# Patient Record
Sex: MALE | Race: WHITE | NOT HISPANIC OR LATINO | Employment: FULL TIME | URBAN - NONMETROPOLITAN AREA
[De-identification: names, ages, dates, MRNs, and addresses within clinical notes are randomized per-mention and may not be internally consistent; named-entity substitution may affect disease eponyms.]

---

## 2023-05-12 ENCOUNTER — TELEPHONE (OUTPATIENT)
Dept: SURGERY | Facility: CLINIC | Age: 23
End: 2023-05-12

## 2023-05-12 ENCOUNTER — OFFICE VISIT (OUTPATIENT)
Dept: SURGERY | Facility: CLINIC | Age: 23
End: 2023-05-12
Payer: COMMERCIAL

## 2023-05-12 VITALS
BODY MASS INDEX: 24.99 KG/M2 | RESPIRATION RATE: 16 BRPM | SYSTOLIC BLOOD PRESSURE: 118 MMHG | WEIGHT: 150 LBS | HEIGHT: 65 IN | DIASTOLIC BLOOD PRESSURE: 72 MMHG | HEART RATE: 72 BPM

## 2023-05-12 DIAGNOSIS — Z87.19 HISTORY OF GI BLEED: ICD-10-CM

## 2023-05-12 DIAGNOSIS — Z86.19 HISTORY OF HELICOBACTER PYLORI INFECTION: ICD-10-CM

## 2023-05-12 DIAGNOSIS — R10.13 EPIGASTRIC PAIN: Primary | ICD-10-CM

## 2023-05-12 DIAGNOSIS — R10.11 RUQ PAIN: ICD-10-CM

## 2023-05-12 PROCEDURE — 99203 OFFICE O/P NEW LOW 30 MIN: CPT | Performed by: SURGERY

## 2023-05-12 RX ORDER — OMEPRAZOLE 40 MG/1
40 CAPSULE, DELAYED RELEASE ORAL DAILY
COMMUNITY

## 2023-05-12 RX ORDER — SUCRALFATE 1 G/1
1 TABLET ORAL 4 TIMES DAILY
Qty: 120 TABLET | Refills: 1 | Status: SHIPPED | OUTPATIENT
Start: 2023-05-12 | End: 2024-05-11

## 2023-05-12 NOTE — PROGRESS NOTES
Garfield Maguire 23 y.o. male presents as a self ref for eval poss EGD.  Pt reports he was DX with a gastric bleed in Dec 2022.  DX with H Pylori in Feb.  Pt states he is still taking the Rx for H Pylori because he decided to stretch out the treatment until he could get EGD.   Chief Complaint   Patient presents with   • EGD             HPI   Above note and agree.  This very pleasant 23-year-old male has been having issues with epigastric and right upper quadrant pain since Christmas of 2020.  He was having some issues with pain and then on Christmas he was going to the bathroom and passed out.  When he woke up he had a lot of coffee-ground emesis in the bathroom.  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  The next day he went to the emergency department because he felt tachycardic.  He had a CT scan that showed hepatic steatosis.  He also had a CBC that showed an anemia.  He had an H. pylori breath test which was positive.  He was treated for his H. pylori with omeprazole and antibiotics and was supposed to get an EGD but he had no insurance so he was unable to do that.  He has been taking omeprazole and has been taking amoxicillin and clarithromycin but not 4 times daily.  He does not smoke or use tobacco products.  He has no chest pain or shortness of breath.  He has no fevers or chills.  He is still having epigastric tenderness.  He drinks occasional soda and [employer].  He is moving his bowels daily without blood.  He also uses a lot of hot sauce.      Review of Systems   All other systems reviewed and are negative.            Current Outpatient Medications:   •  AMOXICILLIN PO, Take  by mouth., Disp: , Rfl:   •  CLARITHROMYCIN PO, Take  by mouth., Disp: , Rfl:   •  omeprazole (priLOSEC) 40 MG capsule, Take 1 capsule by mouth Daily., Disp: , Rfl:   •  sucralfate (Carafate) 1 g tablet, Take 1 tablet by mouth 4 (Four) Times a Day., Disp: 120 tablet, Rfl: 1        No Known Allergies        No past medical  "history on file.        No past surgical history on file.                   There is no immunization history on file for this patient.        Physical Exam  Vitals and nursing note reviewed.   Constitutional:       Appearance: Normal appearance.   HENT:      Head: Normocephalic and atraumatic.   Cardiovascular:      Rate and Rhythm: Normal rate and regular rhythm.   Pulmonary:      Effort: Pulmonary effort is normal.      Breath sounds: Normal breath sounds.   Abdominal:      General: Bowel sounds are normal.      Palpations: Abdomen is soft.   Musculoskeletal:         General: No swelling or tenderness.   Skin:     General: Skin is warm and dry.   Neurological:      General: No focal deficit present.      Mental Status: He is alert and oriented to person, place, and time.   Psychiatric:         Mood and Affect: Mood normal.         Behavior: Behavior normal.         Debilities/Disabilities Identified: None    Emotional Behavior: Appropriate      /72   Pulse 72   Resp 16   Ht 165.1 cm (65\")   Wt 68 kg (150 lb)   BMI 24.96 kg/m²         Diagnoses and all orders for this visit:    1. Epigastric pain (Primary)    2. RUQ pain    3. History of GI bleed    4. History of Helicobacter pylori infection    Other orders  -     sucralfate (Carafate) 1 g tablet; Take 1 tablet by mouth 4 (Four) Times a Day.  Dispense: 120 tablet; Refill: 1    We discussed some dietary changes.  I also told Ramos to stop taking the antibiotics because he was not taking them appropriately.  I will add Carafate.  We will get him scheduled for an EGD and gallbladder ultrasound.  I discussed with the patient the benefits and risks of performing endoscopy.  Benefits and risks were not limited to but including bleeding, infection, perforation, complications of anesthesia, aspiration.  The patient appeared to understand and is willing to proceed.    Thank you for allowing me to participate in the care of this interesting patient.            "

## 2023-05-12 NOTE — TELEPHONE ENCOUNTER
Caller: Garfield Maguire    Relationship: Self    Best call back number: [phone]    What is the best time to reach you: ANY    Who are you requesting to speak with (clinical staff, provider,  specific staff member): RICH    What was the call regarding: PT REQ TO SPEAK TO RICH. UNABLE TO WARM TRANSFER    Do you require a callback: YES

## 2023-05-12 NOTE — H&P
Garfield Maguire 23 y.o. male presents as a self ref for eval poss EGD.  Pt reports he was DX with a gastric bleed in Dec 2022.  DX with H Pylori in Feb.  Pt states he is still taking the Rx for H Pylori because he decided to stretch out the treatment until he could get EGD.   Chief Complaint   Patient presents with   • EGD             HPI   Above note and agree.  This very pleasant 23-year-old male has been having issues with epigastric and right upper quadrant pain since Christmas of 2020.  He was having some issues with pain and then on Christmas he was going to the bathroom and passed out.  When he woke up he had a lot of coffee-ground emesis in the bathroom.  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  The next day he went to the emergency department because he felt tachycardic.  He had a CT scan that showed hepatic steatosis.  He also had a CBC that showed an anemia.  He had an H. pylori breath test which was positive.  He was treated for his H. pylori with omeprazole and antibiotics and was supposed to get an EGD but he had no insurance so he was unable to do that.  He has been taking omeprazole and has been taking amoxicillin and clarithromycin but not 4 times daily.  He does not smoke or use tobacco products.  He has no chest pain or shortness of breath.  He has no fevers or chills.  He is still having epigastric tenderness.  He drinks occasional soda and [employer].  He is moving his bowels daily without blood.  He also uses a lot of hot sauce.      Review of Systems   All other systems reviewed and are negative.            Current Outpatient Medications:   •  AMOXICILLIN PO, Take  by mouth., Disp: , Rfl:   •  CLARITHROMYCIN PO, Take  by mouth., Disp: , Rfl:   •  omeprazole (priLOSEC) 40 MG capsule, Take 1 capsule by mouth Daily., Disp: , Rfl:   •  sucralfate (Carafate) 1 g tablet, Take 1 tablet by mouth 4 (Four) Times a Day., Disp: 120 tablet, Rfl: 1        No Known Allergies        No past medical  "history on file.        No past surgical history on file.                   There is no immunization history on file for this patient.        Physical Exam  Vitals and nursing note reviewed.   Constitutional:       Appearance: Normal appearance.   HENT:      Head: Normocephalic and atraumatic.   Cardiovascular:      Rate and Rhythm: Normal rate and regular rhythm.   Pulmonary:      Effort: Pulmonary effort is normal.      Breath sounds: Normal breath sounds.   Abdominal:      General: Bowel sounds are normal.      Palpations: Abdomen is soft.   Musculoskeletal:         General: No swelling or tenderness.   Skin:     General: Skin is warm and dry.   Neurological:      General: No focal deficit present.      Mental Status: He is alert and oriented to person, place, and time.   Psychiatric:         Mood and Affect: Mood normal.         Behavior: Behavior normal.         Debilities/Disabilities Identified: None    Emotional Behavior: Appropriate      /72   Pulse 72   Resp 16   Ht 165.1 cm (65\")   Wt 68 kg (150 lb)   BMI 24.96 kg/m²         Diagnoses and all orders for this visit:    1. Epigastric pain (Primary)    2. RUQ pain    3. History of GI bleed    4. History of Helicobacter pylori infection    Other orders  -     sucralfate (Carafate) 1 g tablet; Take 1 tablet by mouth 4 (Four) Times a Day.  Dispense: 120 tablet; Refill: 1    We discussed some dietary changes.  I also told Ramos to stop taking the antibiotics because he was not taking them appropriately.  I will add Carafate.  We will get him scheduled for an EGD and gallbladder ultrasound.  I discussed with the patient the benefits and risks of performing endoscopy.  Benefits and risks were not limited to but including bleeding, infection, perforation, complications of anesthesia, aspiration.  The patient appeared to understand and is willing to proceed.    Thank you for allowing me to participate in the care of this interesting " patient.

## 2023-05-16 ENCOUNTER — TELEPHONE (OUTPATIENT)
Dept: SURGERY | Facility: CLINIC | Age: 23
End: 2023-05-16
Payer: COMMERCIAL

## 2023-05-16 DIAGNOSIS — R10.11 RUQ PAIN: Primary | ICD-10-CM

## 2023-05-16 DIAGNOSIS — R10.13 EPIGASTRIC PAIN: ICD-10-CM

## 2023-07-25 PROBLEM — R10.84 GENERALIZED ABDOMINAL PAIN: Status: ACTIVE | Noted: 2023-07-25

## 2023-07-31 ENCOUNTER — LAB (OUTPATIENT)
Dept: LAB | Facility: HOSPITAL | Age: 23
End: 2023-07-31
Payer: COMMERCIAL

## 2023-07-31 ENCOUNTER — HOSPITAL ENCOUNTER (OUTPATIENT)
Dept: GENERAL RADIOLOGY | Facility: HOSPITAL | Age: 23
Discharge: HOME | End: 2023-07-31
Payer: COMMERCIAL

## 2023-07-31 DIAGNOSIS — R06.09 OTHER FORM OF DYSPNEA: ICD-10-CM

## 2023-07-31 DIAGNOSIS — R10.11 RUQ PAIN: ICD-10-CM

## 2023-07-31 DIAGNOSIS — R10.11 RUQ PAIN: Primary | ICD-10-CM

## 2023-07-31 PROCEDURE — 82657 ENZYME CELL ACTIVITY: CPT

## 2023-07-31 PROCEDURE — 86618 LYME DISEASE ANTIBODY: CPT

## 2023-07-31 PROCEDURE — 36415 COLL VENOUS BLD VENIPUNCTURE: CPT

## 2023-07-31 PROCEDURE — 71046 X-RAY EXAM CHEST 2 VIEWS: CPT

## 2023-08-01 DIAGNOSIS — Z86.19 HISTORY OF HELICOBACTER PYLORI INFECTION: Primary | ICD-10-CM

## 2023-08-01 LAB — B BURGDOR IGG+IGM SER QL IA: NEGATIVE

## 2023-08-03 LAB
A-GALACTOSIDASE A SERPL-CCNC: 0.16 U/L (ref 0.07–0.46)
CLINICAL BIOCHEMIST REVIEW: NORMAL
PROVIDER SIGNING NAME: NORMAL
REASON FOR REFERRAL (NARRATIVE): NORMAL

## 2023-08-07 ENCOUNTER — HOSPITAL ENCOUNTER (OUTPATIENT)
Dept: NUCLEAR MEDICINE | Facility: HOSPITAL | Age: 23
Discharge: HOME | End: 2023-08-07
Payer: COMMERCIAL

## 2023-08-07 ENCOUNTER — LAB (OUTPATIENT)
Dept: LAB | Facility: HOSPITAL | Age: 23
End: 2023-08-07
Payer: COMMERCIAL

## 2023-08-07 DIAGNOSIS — Z86.19 HISTORY OF HELICOBACTER PYLORI INFECTION: ICD-10-CM

## 2023-08-07 DIAGNOSIS — R10.11 RUQ PAIN: ICD-10-CM

## 2023-08-07 PROCEDURE — 86677 HELICOBACTER PYLORI ANTIBODY: CPT

## 2023-08-07 PROCEDURE — 36415 COLL VENOUS BLD VENIPUNCTURE: CPT

## 2023-08-08 ENCOUNTER — TELEPHONE (OUTPATIENT)
Dept: SURGERY | Facility: CLINIC | Age: 23
End: 2023-08-08
Payer: COMMERCIAL

## 2023-08-08 LAB — H PYLORI IGA SER-ACNC: >35 UNITS (ref 0–8.9)

## 2023-08-08 NOTE — TELEPHONE ENCOUNTER
Rec'd the following message from pt via My Chart:    I am having some Gi bleeds. Change in color of stool went from normal to dark.     Responded to pt by asking him to call the office to discuss.  Pt reports that on 08/06/2023,  he noticed his stool was darker in color and he has a little nausea.  Pt Informed Dr. Dillon is out of the office until 08/16/23, and asked if he felt like he was ok to wait for her return or if he would like a ref to another provider.  Pt states he is working and will continue monitoring the situation. He will let me know if he thinks he needs to be seen sooner.

## 2023-08-14 ENCOUNTER — HOSPITAL ENCOUNTER (OUTPATIENT)
Dept: NUCLEAR MEDICINE | Facility: HOSPITAL | Age: 23
Discharge: HOME | End: 2023-08-14
Payer: COMMERCIAL

## 2023-08-14 PROCEDURE — 0 TECHNETIUM TC 99M MEBROFENIN KIT: Performed by: SURGERY

## 2023-08-14 PROCEDURE — A9537 TC99M MEBROFENIN: HCPCS | Performed by: SURGERY

## 2023-08-14 PROCEDURE — 78227 HEPATOBIL SYST IMAGE W/DRUG: CPT

## 2023-08-14 PROCEDURE — 25010000002 SINCALIDE PER 5 MCG: Performed by: SURGERY

## 2023-08-14 RX ORDER — KIT FOR THE PREPARATION OF TECHNETIUM TC 99M MEBROFENIN 45 MG/10ML
1 INJECTION, POWDER, LYOPHILIZED, FOR SOLUTION INTRAVENOUS
Status: COMPLETED | OUTPATIENT
Start: 2023-08-14 | End: 2023-08-14

## 2023-08-14 RX ADMIN — SINCALIDE 1.4 MCG: 5 INJECTION, POWDER, LYOPHILIZED, FOR SOLUTION INTRAVENOUS at 09:22

## 2023-08-14 RX ADMIN — MEBROFENIN 1 DOSE: 45 INJECTION, POWDER, LYOPHILIZED, FOR SOLUTION INTRAVENOUS at 08:27

## 2023-08-17 RX ORDER — OMEPRAZOLE 40 MG/1
40 CAPSULE, DELAYED RELEASE ORAL 2 TIMES DAILY WITH MEALS
Qty: 28 CAPSULE | Refills: 0 | Status: SHIPPED | OUTPATIENT
Start: 2023-08-17 | End: 2023-09-04

## 2023-08-29 ENCOUNTER — TELEPHONE (OUTPATIENT)
Dept: SURGERY | Facility: CLINIC | Age: 23
End: 2023-08-29
Payer: COMMERCIAL

## 2023-08-29 NOTE — TELEPHONE ENCOUNTER
Caller: EDDIE WEED    Relationship to patient: SELF     Best call back number: [phone]    Patient is needing: PT CALLED TO GET DIRECTIONS FOR COLONOSCOPY TOMORROW. PLEASE GIVE HIM A CALL BACK ASAP.

## 2023-08-30 ENCOUNTER — HOSPITAL ENCOUNTER (OUTPATIENT)
Facility: SURGERY CENTER | Age: 23
Setting detail: HOSPITAL OUTPATIENT SURGERY
Discharge: HOME | End: 2023-08-30
Attending: SURGERY | Admitting: SURGERY
Payer: COMMERCIAL

## 2023-08-30 ENCOUNTER — ANESTHESIA EVENT (OUTPATIENT)
Dept: SURGERY | Facility: SURGERY CENTER | Age: 23
End: 2023-08-30
Payer: COMMERCIAL

## 2023-08-30 ENCOUNTER — ANESTHESIA (OUTPATIENT)
Dept: SURGERY | Facility: SURGERY CENTER | Age: 23
End: 2023-08-30
Payer: COMMERCIAL

## 2023-08-30 VITALS
HEART RATE: 79 BPM | BODY MASS INDEX: 25.92 KG/M2 | RESPIRATION RATE: 16 BRPM | HEIGHT: 65 IN | TEMPERATURE: 98.4 F | OXYGEN SATURATION: 98 % | SYSTOLIC BLOOD PRESSURE: 106 MMHG | DIASTOLIC BLOOD PRESSURE: 74 MMHG | WEIGHT: 155.6 LBS

## 2023-08-30 DIAGNOSIS — R10.84 GENERALIZED ABDOMINAL PAIN: ICD-10-CM

## 2023-08-30 PROCEDURE — 88305 TISSUE EXAM BY PATHOLOGIST: CPT | Performed by: SURGERY

## 2023-08-30 PROCEDURE — 0 LIDOCAINE 1 % SOLUTION: Performed by: ANESTHESIOLOGY

## 2023-08-30 PROCEDURE — 25010000002 PROPOFOL 10 MG/ML EMULSION: Performed by: ANESTHESIOLOGY

## 2023-08-30 PROCEDURE — 45385 COLONOSCOPY W/LESION REMOVAL: CPT | Performed by: SURGERY

## 2023-08-30 PROCEDURE — 25010000002 PHENYLEPHRINE 10 MG/ML SOLUTION: Performed by: ANESTHESIOLOGY

## 2023-08-30 PROCEDURE — 87081 CULTURE SCREEN ONLY: CPT | Performed by: SURGERY

## 2023-08-30 PROCEDURE — 43239 EGD BIOPSY SINGLE/MULTIPLE: CPT | Performed by: SURGERY

## 2023-08-30 RX ORDER — SODIUM CHLORIDE, SODIUM LACTATE, POTASSIUM CHLORIDE, CALCIUM CHLORIDE 600; 310; 30; 20 MG/100ML; MG/100ML; MG/100ML; MG/100ML
20 INJECTION, SOLUTION INTRAVENOUS CONTINUOUS
Status: DISCONTINUED | OUTPATIENT
Start: 2023-08-30 | End: 2023-08-30 | Stop reason: HOSPADM

## 2023-08-30 RX ORDER — LIDOCAINE HYDROCHLORIDE 10 MG/ML
INJECTION, SOLUTION INFILTRATION; PERINEURAL AS NEEDED
Status: DISCONTINUED | OUTPATIENT
Start: 2023-08-30 | End: 2023-08-30 | Stop reason: SURG

## 2023-08-30 RX ORDER — MAGNESIUM HYDROXIDE 1200 MG/15ML
LIQUID ORAL AS NEEDED
Status: DISCONTINUED | OUTPATIENT
Start: 2023-08-30 | End: 2023-08-30 | Stop reason: HOSPADM

## 2023-08-30 RX ORDER — PHENYLEPHRINE HYDROCHLORIDE 10 MG/ML
INJECTION INTRAVENOUS AS NEEDED
Status: DISCONTINUED | OUTPATIENT
Start: 2023-08-30 | End: 2023-08-30 | Stop reason: SURG

## 2023-08-30 RX ADMIN — PROPOFOL INJECTABLE EMULSION 160 MCG/KG/MIN: 10 INJECTION, EMULSION INTRAVENOUS at 11:16

## 2023-08-30 RX ADMIN — LIDOCAINE HYDROCHLORIDE 60 MG: 10 INJECTION, SOLUTION INFILTRATION; PERINEURAL at 11:16

## 2023-08-30 RX ADMIN — SODIUM CHLORIDE, POTASSIUM CHLORIDE, SODIUM LACTATE AND CALCIUM CHLORIDE 20 ML/HR: 600; 310; 30; 20 INJECTION, SOLUTION INTRAVENOUS at 10:32

## 2023-08-30 RX ADMIN — PHENYLEPHRINE HYDROCHLORIDE 100 MCG: 10 INJECTION INTRAVENOUS at 11:42

## 2023-08-30 NOTE — ANESTHESIA PREPROCEDURE EVALUATION
Anesthesia Evaluation     NPO Solid Status: > 8 hours  NPO Liquid Status: > 8 hours           Airway   Mallampati: I  No difficulty expected  Dental      Pulmonary    Cardiovascular   Exercise tolerance: good (4-7 METS)        Neuro/Psych  GI/Hepatic/Renal/Endo    (+) PUD    Musculoskeletal     Abdominal    Substance History      OB/GYN          Other                      Anesthesia Plan    ASA 1     MAC     intravenous induction     Anesthetic plan, risks, benefits, and alternatives have been provided, discussed and informed consent has been obtained with: patient.    CODE STATUS:

## 2023-08-30 NOTE — ANESTHESIA POSTPROCEDURE EVALUATION
Patient: Garfield Maguire    Procedure Summary       Date: 08/30/23 Room / Location: [facility]    Anesthesia Start: 1111 Anesthesia Stop: 1148    Procedures:       ESOPHAGOGASTRODUODENOSCOPY      COLONOSCOPY 11:00 Diagnosis:       Generalized abdominal pain      (Generalized abdominal pain [R10.84])    Surgeons: Mayela Dillon DO Provider: Darryl Perez MD    Anesthesia Type: MAC ASA Status: 1            Anesthesia Type: MAC    Vitals  Vitals Value Taken Time   /74 08/30/23 1213   Temp 36.9 øC (98.4 øF) 08/30/23 1148   Pulse 79 08/30/23 1213   Resp 16 08/30/23 1213   SpO2 98 % 08/30/23 1213           Post Anesthesia Care and Evaluation    Patient location during evaluation: bedside  Patient participation: complete - patient participated  Level of consciousness: responsive to light touch, responsive to verbal stimuli and sleepy but conscious  Pain score: 0  Pain management: adequate    Airway patency: patent  Anesthetic complications: No anesthetic complications  PONV Status: none  Cardiovascular status: acceptable and hemodynamically stable  Respiratory status: acceptable  Hydration status: acceptable

## 2023-08-30 NOTE — OP NOTE
EGD and Colonoscopy Procedure Note      Pre-operative Diagnosis: Abdominal pain, history of GI bleed, history of H. pylori infection    Post-operative Diagnosis: Esophagitis, gastric ulcer, duodenitis, polyp of the sigmoid colon    Procedure:  EGD with biopsies with cold forceps and  Colonoscopy with polypectomy using hot snare    Surgeon: Santana    Anesthetic: BONI per Darryl Perez MD    Estimated Blood Loss: Minimal    Complications: None    Indications: See preoperative diagnosis    Findings/Treatments:   Esophagitis-biopsy of GE junction with cold forceps  Gastric ulcer-biopsy for H. pylori with cold forceps  Duodenitis-multiple biopsies of duodenum with cold forceps  Sigmoid colon polyp-removed with hot snare       Scope Withdrawal Time:  6 minutes      Recommendations: Await pathology      Procedure Details     After discussing the benefits and risks of an EGD and Colonoscopy, benefits and risks not limited to but including:  Bleeding, infection, perforation, aspiration; informed consent was signed.  The patient was taken into the endoscopy suite at [facility] and placed in the left lateral decubitus position.  MAC anesthesia was induced under appropriate monitoring.  Bite block was placed and the gastroscope was inserted thru such and advanced under direct vision to second portion of duodenum.  A careful inspection was made as the gastroscope was withdrawn, including a retroflexed view of the proximal stomach; findings and interventions are described below.  If biopsies were taken, this was done with the cold biopsy forceps.    The bed was then rotated 180 degrees.  A rectal exam was performed.  Sphincter tone was normal.  The colonoscope was then inserted and carefully advanced to the cecum while visualizing the mucosa.  The cecum was identified by the ileocecal valve and the orifice of the appendix.  Once in the cecum the terminal ileum was intubated.  We advanced the scope  several centimeters of the terminal ileum and found no abnormalities.  The scope was then withdrawn into the cecum.  The scope was then slowly withdrawn while carefully evaluating the mucosa and deflating air.  The only abnormality noted was a large sigmoid colon polyp removed with hot snare.  Once in the rectum the scope was retroflexed and straightened and removed.  Ramos was then taken to the recovery area in stable postoperative condition having tolerated his procedure well.    Mayela Dillon DO

## 2023-08-30 NOTE — H&P
Garfield Maguire 23 y.o. male presents as a self ref for eval poss EGD.  Pt reports he was DX with a gastric bleed in Dec 2022.  DX with H Pylori in Feb.  Pt states he is still taking the Rx for H Pylori because he decided to stretch out the treatment until he could get EGD.       Chief Complaint   Patient presents with    EGD                  HPI   Above note and agree.  This very pleasant 23-year-old male has been having issues with epigastric and right upper quadrant pain since Christmas of 2020.  He was having some issues with pain and then on Christmas he was going to the bathroom and passed out.  When he woke up he had a lot of coffee-ground emesis in the bathroom.  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  The next day he went to the emergency department because he felt tachycardic.  He had a CT scan that showed hepatic steatosis.  He also had a CBC that showed an anemia.  He had an H. pylori breath test which was positive.  He was treated for his H. pylori with omeprazole and antibiotics and was supposed to get an EGD but he had no insurance so he was unable to do that.  He has been taking omeprazole and has been taking amoxicillin and clarithromycin but not 4 times daily.  He does not smoke or use tobacco products.  He has no chest pain or shortness of breath.  He has no fevers or chills.  He is still having epigastric tenderness.  He drinks occasional soda and [employer].  He is moving his bowels daily without blood.  He also uses a lot of hot sauce.        Review of Systems   All other systems reviewed and are negative.                 Current Outpatient Medications:     AMOXICILLIN PO, Take  by mouth., Disp: , Rfl:     CLARITHROMYCIN PO, Take  by mouth., Disp: , Rfl:     omeprazole (priLOSEC) 40 MG capsule, Take 1 capsule by mouth Daily., Disp: , Rfl:     sucralfate (Carafate) 1 g tablet, Take 1 tablet by mouth 4 (Four) Times a Day., Disp: 120 tablet, Rfl: 1           No Known Allergies          "  No past medical history on file.           No past surgical history on file.                          There is no immunization history on file for this patient.           Physical Exam  Vitals and nursing note reviewed.   Constitutional:       Appearance: Normal appearance.   HENT:      Head: Normocephalic and atraumatic.   Cardiovascular:      Rate and Rhythm: Normal rate and regular rhythm.   Pulmonary:      Effort: Pulmonary effort is normal.      Breath sounds: Normal breath sounds.   Abdominal:      General: Bowel sounds are normal.      Palpations: Abdomen is soft.   Musculoskeletal:         General: No swelling or tenderness.   Skin:     General: Skin is warm and dry.   Neurological:      General: No focal deficit present.      Mental Status: He is alert and oriented to person, place, and time.   Psychiatric:         Mood and Affect: Mood normal.         Behavior: Behavior normal.            Debilities/Disabilities Identified: None     Emotional Behavior: Appropriate        /72   Pulse 72   Resp 16   Ht 165.1 cm (65\")   Wt 68 kg (150 lb)   BMI 24.96 kg/mý            Diagnoses and all orders for this visit:     1. Epigastric pain (Primary)     2. RUQ pain     3. History of GI bleed     4. History of Helicobacter pylori infection     Other orders  -     sucralfate (Carafate) 1 g tablet; Take 1 tablet by mouth 4 (Four) Times a Day.  Dispense: 120 tablet; Refill: 1     We discussed some dietary changes.  I also told Ramos to stop taking the antibiotics because he was not taking them appropriately.  I will add Carafate.  We will get him scheduled for an EGD and gallbladder ultrasound.  I discussed with the patient the benefits and risks of performing endoscopy.  Benefits and risks were not limited to but including bleeding, infection, perforation, complications of anesthesia, aspiration.  The patient appeared to understand and is willing to proceed.     Thank you for allowing me to participate in the " care of this interesting patient.

## 2023-08-31 LAB
LAB AP CASE REPORT: NORMAL
LAB AP DIAGNOSIS COMMENT: NORMAL
PATH REPORT.FINAL DX SPEC: NORMAL
PATH REPORT.GROSS SPEC: NORMAL
UREASE TISS QL: POSITIVE

## 2023-09-01 RX ORDER — OMEPRAZOLE 40 MG/1
40 CAPSULE, DELAYED RELEASE ORAL 2 TIMES DAILY WITH MEALS
Qty: 28 CAPSULE | Refills: 0 | Status: SHIPPED | OUTPATIENT
Start: 2023-09-01 | End: 2023-09-15

## 2023-09-01 RX ORDER — METRONIDAZOLE 500 MG/1
500 TABLET ORAL 4 TIMES DAILY
Qty: 56 TABLET | Refills: 0 | Status: SHIPPED | OUTPATIENT
Start: 2023-09-01 | End: 2023-09-15

## 2023-09-01 RX ORDER — TETRACYCLINE HYDROCHLORIDE 500 MG/1
500 CAPSULE ORAL 4 TIMES DAILY
Qty: 56 CAPSULE | Refills: 0 | Status: SHIPPED | OUTPATIENT
Start: 2023-09-01 | End: 2023-09-15

## 2023-09-26 ENCOUNTER — LAB (OUTPATIENT)
Dept: LAB | Facility: HOSPITAL | Age: 23
End: 2023-09-26
Payer: COMMERCIAL

## 2023-09-26 DIAGNOSIS — K62.5 RECTAL BLEEDING: Primary | ICD-10-CM

## 2023-09-26 DIAGNOSIS — K62.5 RECTAL BLEEDING: ICD-10-CM

## 2023-09-26 LAB
HCT VFR BLD AUTO: 38.5 % (ref 37.5–51)
HGB BLD-MCNC: 12.3 G/DL (ref 13–17.7)

## 2023-09-26 PROCEDURE — 36415 COLL VENOUS BLD VENIPUNCTURE: CPT

## 2023-09-26 PROCEDURE — 85018 HEMOGLOBIN: CPT

## 2023-09-26 PROCEDURE — 85014 HEMATOCRIT: CPT

## 2023-09-27 ENCOUNTER — ANESTHESIA EVENT (OUTPATIENT)
Dept: PERIOP | Facility: HOSPITAL | Age: 23
End: 2023-09-27
Payer: COMMERCIAL

## 2023-09-27 ENCOUNTER — ANESTHESIA (OUTPATIENT)
Dept: PERIOP | Facility: HOSPITAL | Age: 23
End: 2023-09-27
Payer: COMMERCIAL

## 2023-09-27 ENCOUNTER — HOSPITAL ENCOUNTER (OUTPATIENT)
Facility: HOSPITAL | Age: 23
Setting detail: OBSERVATION
Discharge: HOME | End: 2023-09-28
Attending: SURGERY | Admitting: SURGERY
Payer: COMMERCIAL

## 2023-09-27 DIAGNOSIS — K92.2 ACUTE GI BLEEDING: Primary | ICD-10-CM

## 2023-09-27 LAB
ALBUMIN SERPL-MCNC: 4.3 G/DL (ref 3.5–5.2)
ALBUMIN/GLOB SERPL: 1.7 G/DL
ALP SERPL-CCNC: 61 U/L (ref 39–117)
ALT SERPL W P-5'-P-CCNC: 37 U/L (ref 1–41)
ANION GAP SERPL CALCULATED.3IONS-SCNC: 11.7 MMOL/L (ref 5–15)
AST SERPL-CCNC: 24 U/L (ref 1–40)
BASOPHILS # BLD AUTO: 0.04 10*3/MM3 (ref 0–0.2)
BASOPHILS NFR BLD AUTO: 0.9 % (ref 0–1.5)
BILIRUB SERPL-MCNC: 0.4 MG/DL (ref 0–1.2)
BUN SERPL-MCNC: 11 MG/DL (ref 6–20)
BUN/CREAT SERPL: 14.7 (ref 7–25)
CALCIUM SPEC-SCNC: 8.8 MG/DL (ref 8.6–10.5)
CHLORIDE SERPL-SCNC: 105 MMOL/L (ref 98–107)
CO2 SERPL-SCNC: 23.3 MMOL/L (ref 22–29)
CREAT SERPL-MCNC: 0.75 MG/DL (ref 0.76–1.27)
DEPRECATED RDW RBC AUTO: 41.3 FL (ref 37–54)
EGFRCR SERPLBLD CKD-EPI 2021: 128.4 ML/MIN/1.73
EOSINOPHIL # BLD AUTO: 0.13 10*3/MM3 (ref 0–0.4)
EOSINOPHIL NFR BLD AUTO: 2.8 % (ref 0.3–6.2)
ERYTHROCYTE [DISTWIDTH] IN BLOOD BY AUTOMATED COUNT: 14.6 % (ref 12.3–15.4)
GLOBULIN UR ELPH-MCNC: 2.6 GM/DL
GLUCOSE SERPL-MCNC: 108 MG/DL (ref 65–99)
HCT VFR BLD AUTO: 39 % (ref 37.5–51)
HGB BLD-MCNC: 12.4 G/DL (ref 13–17.7)
IMM GRANULOCYTES # BLD AUTO: 0.01 10*3/MM3 (ref 0–0.05)
IMM GRANULOCYTES NFR BLD AUTO: 0.2 % (ref 0–0.5)
INR PPP: 1.09 (ref 0.9–1.1)
LYMPHOCYTES # BLD AUTO: 1.26 10*3/MM3 (ref 0.7–3.1)
LYMPHOCYTES NFR BLD AUTO: 27.2 % (ref 19.6–45.3)
MCH RBC QN AUTO: 25.1 PG (ref 26.6–33)
MCHC RBC AUTO-ENTMCNC: 31.8 G/DL (ref 31.5–35.7)
MCV RBC AUTO: 78.8 FL (ref 79–97)
MONOCYTES # BLD AUTO: 0.69 10*3/MM3 (ref 0.1–0.9)
MONOCYTES NFR BLD AUTO: 14.9 % (ref 5–12)
NEUTROPHILS NFR BLD AUTO: 2.5 10*3/MM3 (ref 1.7–7)
NEUTROPHILS NFR BLD AUTO: 54 % (ref 42.7–76)
NRBC BLD AUTO-RTO: 0 /100 WBC (ref 0–0.2)
PLATELET # BLD AUTO: 268 10*3/MM3 (ref 140–450)
PMV BLD AUTO: 10.3 FL (ref 6–12)
POTASSIUM SERPL-SCNC: 4.2 MMOL/L (ref 3.5–5.2)
PROT SERPL-MCNC: 6.9 G/DL (ref 6–8.5)
PROTHROMBIN TIME: 14.1 SECONDS (ref 12.1–15)
RBC # BLD AUTO: 4.95 10*6/MM3 (ref 4.14–5.8)
SODIUM SERPL-SCNC: 140 MMOL/L (ref 136–145)
WBC NRBC COR # BLD: 4.63 10*3/MM3 (ref 3.4–10.8)

## 2023-09-27 PROCEDURE — 25010000002 PROPOFOL 200 MG/20ML EMULSION: Performed by: NURSE ANESTHETIST, CERTIFIED REGISTERED

## 2023-09-27 PROCEDURE — 45380 COLONOSCOPY AND BIOPSY: CPT | Performed by: SURGERY

## 2023-09-27 PROCEDURE — G0378 HOSPITAL OBSERVATION PER HR: HCPCS

## 2023-09-27 PROCEDURE — 96360 HYDRATION IV INFUSION INIT: CPT

## 2023-09-27 PROCEDURE — 94799 UNLISTED PULMONARY SVC/PX: CPT

## 2023-09-27 PROCEDURE — 96361 HYDRATE IV INFUSION ADD-ON: CPT

## 2023-09-27 PROCEDURE — 85025 COMPLETE CBC W/AUTO DIFF WBC: CPT | Performed by: SURGERY

## 2023-09-27 PROCEDURE — 80053 COMPREHEN METABOLIC PANEL: CPT | Performed by: SURGERY

## 2023-09-27 PROCEDURE — 88305 TISSUE EXAM BY PATHOLOGIST: CPT | Performed by: SURGERY

## 2023-09-27 PROCEDURE — 85610 PROTHROMBIN TIME: CPT | Performed by: SURGERY

## 2023-09-27 PROCEDURE — 43239 EGD BIOPSY SINGLE/MULTIPLE: CPT | Performed by: SURGERY

## 2023-09-27 PROCEDURE — 25810000003 LACTATED RINGERS PER 1000 ML: Performed by: SURGERY

## 2023-09-27 RX ORDER — SODIUM CHLORIDE, SODIUM LACTATE, POTASSIUM CHLORIDE, CALCIUM CHLORIDE 600; 310; 30; 20 MG/100ML; MG/100ML; MG/100ML; MG/100ML
100 INJECTION, SOLUTION INTRAVENOUS CONTINUOUS
Status: DISCONTINUED | OUTPATIENT
Start: 2023-09-27 | End: 2023-09-27

## 2023-09-27 RX ORDER — LIDOCAINE HYDROCHLORIDE 20 MG/ML
INJECTION, SOLUTION EPIDURAL; INFILTRATION; INTRACAUDAL; PERINEURAL AS NEEDED
Status: DISCONTINUED | OUTPATIENT
Start: 2023-09-27 | End: 2023-09-27 | Stop reason: SURG

## 2023-09-27 RX ORDER — SODIUM CHLORIDE 9 MG/ML
40 INJECTION, SOLUTION INTRAVENOUS AS NEEDED
Status: DISCONTINUED | OUTPATIENT
Start: 2023-09-27 | End: 2023-09-27 | Stop reason: HOSPADM

## 2023-09-27 RX ORDER — SODIUM CHLORIDE, SODIUM LACTATE, POTASSIUM CHLORIDE, CALCIUM CHLORIDE 600; 310; 30; 20 MG/100ML; MG/100ML; MG/100ML; MG/100ML
9 INJECTION, SOLUTION INTRAVENOUS CONTINUOUS
Status: DISCONTINUED | OUTPATIENT
Start: 2023-09-27 | End: 2023-09-27

## 2023-09-27 RX ORDER — SODIUM CHLORIDE 0.9 % (FLUSH) 0.9 %
10 SYRINGE (ML) INJECTION AS NEEDED
Status: DISCONTINUED | OUTPATIENT
Start: 2023-09-27 | End: 2023-09-27 | Stop reason: HOSPADM

## 2023-09-27 RX ORDER — SUCRALFATE 1 G/1
1 TABLET ORAL 4 TIMES DAILY
Status: DISCONTINUED | OUTPATIENT
Start: 2023-09-27 | End: 2023-09-28 | Stop reason: HOSPADM

## 2023-09-27 RX ORDER — LIDOCAINE HYDROCHLORIDE 10 MG/ML
0.5 INJECTION, SOLUTION INFILTRATION; PERINEURAL ONCE AS NEEDED
Status: DISCONTINUED | OUTPATIENT
Start: 2023-09-27 | End: 2023-09-27 | Stop reason: HOSPADM

## 2023-09-27 RX ORDER — MAGNESIUM HYDROXIDE 1200 MG/15ML
LIQUID ORAL AS NEEDED
Status: DISCONTINUED | OUTPATIENT
Start: 2023-09-27 | End: 2023-09-27 | Stop reason: HOSPADM

## 2023-09-27 RX ORDER — MAGNESIUM CARB/ALUMINUM HYDROX 105-160MG
150 TABLET,CHEWABLE ORAL ONCE
Status: COMPLETED | OUTPATIENT
Start: 2023-09-27 | End: 2023-09-27

## 2023-09-27 RX ORDER — ONDANSETRON 2 MG/ML
4 INJECTION INTRAMUSCULAR; INTRAVENOUS ONCE AS NEEDED
Status: DISCONTINUED | OUTPATIENT
Start: 2023-09-27 | End: 2023-09-27 | Stop reason: HOSPADM

## 2023-09-27 RX ORDER — SODIUM CHLORIDE 0.9 % (FLUSH) 0.9 %
10 SYRINGE (ML) INJECTION EVERY 12 HOURS SCHEDULED
Status: DISCONTINUED | OUTPATIENT
Start: 2023-09-27 | End: 2023-09-27 | Stop reason: HOSPADM

## 2023-09-27 RX ORDER — PROPOFOL 10 MG/ML
INJECTION, EMULSION INTRAVENOUS AS NEEDED
Status: DISCONTINUED | OUTPATIENT
Start: 2023-09-27 | End: 2023-09-27 | Stop reason: SURG

## 2023-09-27 RX ADMIN — PROPOFOL INJECTABLE EMULSION 100 MG: 10 INJECTION, EMULSION INTRAVENOUS at 16:39

## 2023-09-27 RX ADMIN — SODIUM CHLORIDE, POTASSIUM CHLORIDE, SODIUM LACTATE AND CALCIUM CHLORIDE 100 ML/HR: 600; 310; 30; 20 INJECTION, SOLUTION INTRAVENOUS at 08:30

## 2023-09-27 RX ADMIN — MAGNESIUM CITRATE 150 ML: 1.75 LIQUID ORAL at 08:58

## 2023-09-27 RX ADMIN — SUCRALFATE 1 G: 1 TABLET ORAL at 20:43

## 2023-09-27 RX ADMIN — PROPOFOL INJECTABLE EMULSION 100 MG: 10 INJECTION, EMULSION INTRAVENOUS at 16:25

## 2023-09-27 RX ADMIN — LIDOCAINE HYDROCHLORIDE 100 MG: 20 INJECTION, SOLUTION EPIDURAL; INFILTRATION; INTRACAUDAL; PERINEURAL at 16:19

## 2023-09-27 RX ADMIN — PROPOFOL INJECTABLE EMULSION 100 MG: 10 INJECTION, EMULSION INTRAVENOUS at 16:19

## 2023-09-27 RX ADMIN — PROPOFOL INJECTABLE EMULSION 100 MG: 10 INJECTION, EMULSION INTRAVENOUS at 16:22

## 2023-09-27 RX ADMIN — PROPOFOL INJECTABLE EMULSION 100 MG: 10 INJECTION, EMULSION INTRAVENOUS at 16:31

## 2023-09-27 RX ADMIN — SODIUM CHLORIDE, POTASSIUM CHLORIDE, SODIUM LACTATE AND CALCIUM CHLORIDE: 600; 310; 30; 20 INJECTION, SOLUTION INTRAVENOUS at 16:13

## 2023-09-27 NOTE — CASE MANAGEMENT/SOCIAL WORK
Discharge Planning Assessment  [facility]     Patient Name: Garfield Maguire  MRN: [id number]  Today's Date: 9/27/2023    Admit Date: 9/27/2023    Plan: Home   Discharge Needs Assessment       Row Name 09/27/23 1044       Living Environment    People in Home other relative(s)    Name(s) of People in Home Connie Mendell, family    Current Living Arrangements home    Duration at Residence 1 Y    Potentially Unsafe Housing Conditions none    Primary Care Provided by self    Provides Primary Care For no one    Caregiving Concerns no care giving concerns voiced by patient at this time.    Family Caregiver if Needed parent(s)    Family Caregiver Names Nelly, mother    Quality of Family Relationships unable to assess    Able to Return to Prior Arrangements yes    Living Arrangement Comments Patient states he lives with a family member in a two story house with one step to gain entry       Resource/Environmental Concerns    Resource/Environmental Concerns none    Transportation Concerns none       Food Insecurity    Within the past 12 months, you worried that your food would run out before you got the money to buy more. Never true    Within the past 12 months, the food you bought just didn't last and you didn't have money to get more. Never true       Transition Planning    Patient/Family Anticipates Transition to home with family    Patient/Family Anticipated Services at Transition none    Transportation Anticipated family or friend will provide  pt states he will have to call a family member or friend to provide his ride home       Discharge Needs Assessment    Readmission Within the Last 30 Days no previous admission in last 30 days    Current Outpatient/Agency/Support Group --  none    Equipment Currently Used at Home none    Concerns to be Addressed denies needs/concerns at this time;adjustment to diagnosis/illness;discharge planning    Concerns Comments pt voiced no discharge needs at this time.    Anticipated Changes  Related to Illness none    Equipment Needed After Discharge none    Outpatient/Agency/Support Group Needs --  pt declined the need for these services at this time.    Discharge Facility/Level of Care Needs --  pt declined the need for these services at this time.    Provided Post Acute Provider List? Refused    Refused Provider List Comment pt declined    Patient's Choice of Community Agency(s) none    Current Discharge Risk --  none    Discharge Coordination/Progress Patient states he plans on returning home at discharge with his family to help as needed and voiced no discharge needs at this time.                   Discharge Plan       Row Name 09/27/23 1056       Plan    Plan Home    Patient/Family in Agreement with Plan yes    Plan Comments 0845 am, into room and introduced self and role of CM. Discussed discharge disposition with patient at bedside. Patient is currently resting in bed and voiced no complaints. He states he is having a colonoscopy and EGD today by Dr. Dillon. Patient confirms that the info on her face sheet is correct and that he currently does not have a PCP and CM provided him a list of providers in the area who are accepting new patients for follow up and he verbalized understanding. Patient states he uses [facility] and currently has no problem picking up or paying for his med's. He also states he does not have a living will and declined the need for information regarding one. Patient states he lives with a family member, Melina, in a two story house with one step to gain entry and normally has no issues entering the home or maneuvering inside. He states he is independent with his ADL's, works and drives and states he will have to call a family member to provide his ride at discharge. He also states he does not currently use any DME at home at does not anticipate needing any equipment at discharge. Patient states he has not used home health in the past and states he will not  need this service at discharge and denies the need for other services such as STR or LTC at discharge. Patient states he plans on returning home at discharge with his family to help if needed and voiced no discharge needs at this time. He had no other questions or concerns regarding discharge plans. Name and number placed on white board in room. CM will follow.                  Continued Care and Services - Admitted Since 9/27/2023    Coordination has not been started for this encounter.          Demographic Summary       Row Name 09/27/23 1044       General Information    Admission Type observation    Arrived From home    Referral Source admission list    Reason for Consult discharge planning    Preferred Language English       Contact Information    Permission Granted to Share Info With                    Functional Status    No documentation.                  Psychosocial    No documentation.                  Abuse/Neglect    No documentation.                  Legal    No documentation.                  Substance Abuse    No documentation.                  Patient Forms    No documentation.                     Kaela Bray RN

## 2023-09-27 NOTE — ANESTHESIA PREPROCEDURE EVALUATION
Anesthesia Evaluation     Patient summary reviewed and Nursing notes reviewed   no history of anesthetic complications:   NPO Solid Status: > 8 hours  NPO Liquid Status: > 8 hours           Airway   Mallampati: II  TM distance: >3 FB  Neck ROM: full  No difficulty expected  Dental - normal exam     Pulmonary - negative pulmonary ROS and normal exam    breath sounds clear to auscultation  Cardiovascular - negative cardio ROS and normal exam  Exercise tolerance: excellent (>7 METS)    Rhythm: regular  Rate: normal        Neuro/Psych- negative ROS  GI/Hepatic/Renal/Endo    (+) PUD, GI bleeding active bleeding    Musculoskeletal (-) negative ROS    Abdominal  - normal exam   Substance History - negative use     OB/GYN          Other - negative ROS                     Anesthesia Plan    ASA 1 - emergent     intravenous induction     Anesthetic plan, risks, benefits, and alternatives have been provided, discussed and informed consent has been obtained with: patient.    Use of blood products discussed with patient  Consented to blood products.      CODE STATUS:

## 2023-09-27 NOTE — H&P
General Surgery      Patient Care Team:  Provider, No Known as PCP - General    CHIEF COMPLAINT: GI bleed    HISTORY OF PRESENT ILLNESS:    This very pleasant 23-year-old male is known to my service.  He has a history of GI bleeds as well as H. pylori that was refractory to traditional treatment.  I did an EGD and colonoscopy on him last month or found a very large polyp in the sigmoid colon that we removed with a hot snare as well as severe duodenitis.  We treated him with an alternative H. pylori treatment and it did resolve his symptoms until yesterday when he developed rectal bleeding.  He said he would feel abdominal cramping like he had to move his bowels and then when he went it was all blood.  This happened at least 5 times.  We ordered an H&H yesterday evening that showed a hemoglobin of 12.  On discussion with him this morning he had bleeding all night.  I admitted him as observation this morning and gave him the light prep and IV resuscitation.  He still denies pain or nausea but still had some bleeding today.      Past Medical History:   Diagnosis Date    Multiple gastric ulcers      Past Surgical History:   Procedure Laterality Date    COLONOSCOPY      COLONOSCOPY N/A 8/30/2023    Procedure: COLONOSCOPY 11:00;  Surgeon: Mayela Dillon DO;  Location: [facility] MAIN OR;  Service: Gastroenterology;  Laterality: N/A;  sigmoid colon polyp hot snare    ENDOSCOPY      ENDOSCOPY N/A 8/30/2023    Procedure: ESOPHAGOGASTRODUODENOSCOPY;  Surgeon: Mayela Dillon DO;  Location: [facility] MAIN OR;  Service: Gastroenterology;  Laterality: N/A;  duodenitis, superficial gastric ulcer, gastritis, GE junction bx, duodenal bx, clotest     History reviewed. No pertinent family history.  Social History     Tobacco Use    Smoking status: Never    Smokeless tobacco: Never   Vaping Use    Vaping Use: Never used   Substance Use Topics    Alcohol use: Not Currently    Drug use: Never     Medications Prior to Admission  "  Medication Sig Dispense Refill Last Dose    sucralfate (Carafate) 1 g tablet Take 1 tablet by mouth 4 (Four) Times a Day. 120 tablet 1 Past Month     Allergies:  Patient has no known allergies.    REVIEW OF SYSTEMS:  Please see the above history of present illness for pertinent positives and negatives.  The remainder of the patient's systems have been reviewed and are negative.     Vital Signs  Temp:  [97.6 °F (36.4 °C)-98.3 °F (36.8 °C)] 97.6 °F (36.4 °C)  Heart Rate:  [68-86] 86  Resp:  [15-16] 15  BP: (128-142)/(67-83) 142/83    Flowsheet Rows      Flowsheet Row First Filed Value   Admission Height 165.1 cm (65\") Documented at 09/27/2023 0727   Admission Weight 71 kg (156 lb 9.6 oz) Documented at 09/27/2023 0727             Physical Exam:  Physical Exam   Constitutional: Patient appears well-developed and well-nourished and in no acute distress   HEENT:   Head: Normocephalic and atraumatic.   Eyes:  Pupils are equal, round, and reactive to light.  Mouth and Throat: Patient has moist mucous membranes. Oropharynx is clear of any erythema or exudate.     Neck: Neck supple. No JVD present. No thyromegaly present. No lymphadenopathy present.  Cardiovascular: Regular rate, regular rhythm.  Pulmonary/Chest: Lungs are clear to auscultation bilaterally.  Abdominal: soft, good bowel sounds, mild tenderness to to deep palpation  Musculoskeletal: Normal posture.  Extremities: No edema.   Neurological: Patient is alert and oriented.  Psychological:   Mood and behavior appropriate.  Skin: Skin is warm and dry.    Debilities/Disabilities Identified: None    Emotional Behavior: Appropriate           Results Review:    I reviewed the patient's new clinical results.  Lab Results (most recent)       Procedure Component Value Units Date/Time    Comprehensive Metabolic Panel [[id number]]  (Abnormal) Collected: 09/27/23 0750    Specimen: Blood Updated: 09/27/23 0827     Glucose 108 mg/dL      BUN 11 mg/dL      Creatinine 0.75 mg/dL  "     Sodium 140 mmol/L      Potassium 4.2 mmol/L      Chloride 105 mmol/L      CO2 23.3 mmol/L      Calcium 8.8 mg/dL      Total Protein 6.9 g/dL      Albumin 4.3 g/dL      ALT (SGPT) 37 U/L      AST (SGOT) 24 U/L      Alkaline Phosphatase 61 U/L      Total Bilirubin 0.4 mg/dL      Globulin 2.6 gm/dL      A/G Ratio 1.7 g/dL      BUN/Creatinine Ratio 14.7     Anion Gap 11.7 mmol/L      eGFR 128.4 mL/min/1.73     Narrative:      GFR Normal >60  Chronic Kidney Disease <60  Kidney Failure <15      Protime-INR [[id number]]  (Normal) Collected: 09/27/23 0750    Specimen: Blood Updated: 09/27/23 0820     Protime 14.1 Seconds      INR 1.09    Narrative:      Therapeutic Ranges for INR: 2.0-3.0 (PT 20-30)                              2.5-3.5 (PT 25-34)    CBC & Differential [[id number]]  (Abnormal) Collected: 09/27/23 0750    Specimen: Blood Updated: 09/27/23 0759    Narrative:      The following orders were created for panel order CBC & Differential.  Procedure                               Abnormality         Status                     ---------                               -----------         ------                     CBC Auto Differential[[id number]]        Abnormal            Final result                 Please view results for these tests on the individual orders.    CBC Auto Differential [[id number]]  (Abnormal) Collected: 09/27/23 0750    Specimen: Blood Updated: 09/27/23 0759     WBC 4.63 10*3/mm3      RBC 4.95 10*6/mm3      Hemoglobin 12.4 g/dL      Hematocrit 39.0 %      MCV 78.8 fL      MCH 25.1 pg      MCHC 31.8 g/dL      RDW 14.6 %      RDW-SD 41.3 fl      MPV 10.3 fL      Platelets 268 10*3/mm3      Neutrophil % 54.0 %      Lymphocyte % 27.2 %      Monocyte % 14.9 %      Eosinophil % 2.8 %      Basophil % 0.9 %      Immature Grans % 0.2 %      Neutrophils, Absolute 2.50 10*3/mm3      Lymphocytes, Absolute 1.26 10*3/mm3      Monocytes, Absolute 0.69 10*3/mm3      Eosinophils, Absolute 0.13 10*3/mm3      Basophils,  Absolute 0.04 10*3/mm3      Immature Grans, Absolute 0.01 10*3/mm3      nRBC 0.0 /100 WBC             Imaging Results (Most Recent)       None          reviewed    ECG/EMG Results (most recent)       None              Assessment & Plan     Rectal bleeding  I discussed with the patient the benefits and risks of performing endoscopy.  Benefits and risks were not limited to but including bleeding, infection, perforation, complications of anesthesia, aspiration.  The patient appeared to understand and is willing to proceed.    Thank you for allowing me to participate in the care of this interesting patient.      Mayela Dillon DO  09/27/23  15:44 EDT

## 2023-09-27 NOTE — OP NOTE
EGD and Colonoscopy Procedure Note      Pre-operative Diagnosis: GI bleed    Post-operative Diagnosis: Mild duodenitis and proctocolitis of the rectum and sigmoid colon    Procedure:  EGD with and  Colonoscopy biopsies with cold forceps    Surgeon: Santana    Anesthetic: Edenilson Cain CRNA    Estimated Blood Loss: Minimal    Complications: None    Indications: See preoperative gnosis    Findings/Treatments:   Mild duodenitis-the irritation of the duodenum looked improved from his EGD in August  Proctocolitis of the rectum and sigmoid colon-multiple biopsies with cold forceps       Scope Withdrawal Time:  6 minutes      Recommendations: Await pathology      Procedure Details     After discussing the benefits and risks of an EGD and Colonoscopy, benefits and risks not limited to but including:  Bleeding, infection, perforation, aspiration; informed consent was signed.  The patient was taken into the endoscopy suite at [facility] and placed in the left lateral decubitus position.  MAC anesthesia was induced under appropriate monitoring.  Bite block was placed and the gastroscope was inserted thru such and advanced under direct vision to second portion of the duodenum.  A careful inspection was made as the gastroscope was withdrawn, including a retroflexed view of the proximal stomach; findings and interventions are described below.  If biopsies were taken, this was done with the cold biopsy forceps.    The bed was then rotated 180 degrees.  A rectal exam was performed.  Sphincter tone was normal.  The colonoscope was then inserted and carefully advanced to the cecum while visualizing the mucosa.  The cecum was identified by the ileocecal valve and the orifice of the appendix.  Once in the cecum the terminal ileum was intubated and was normal.  The scope was then slowly withdrawn while carefully evaluating the mucosa and deflating air.  Ramos had proctocolitis of the distal sigmoid  colon and rectum.  Multiple biopsies were obtained with cold forceps.  The scope was unretroflexed and straightened and removed.  There was no active bleeding noted but the proctocolitis was most likely the source of his bleeding.  Ramos was then taken to the recovery area in stable postoperative condition having tolerated his procedure well.    Mayela Dillon DO

## 2023-09-28 VITALS
RESPIRATION RATE: 16 BRPM | SYSTOLIC BLOOD PRESSURE: 125 MMHG | HEIGHT: 65 IN | DIASTOLIC BLOOD PRESSURE: 78 MMHG | TEMPERATURE: 97.7 F | BODY MASS INDEX: 25.99 KG/M2 | OXYGEN SATURATION: 97 % | HEART RATE: 83 BPM | WEIGHT: 156 LBS

## 2023-09-28 DIAGNOSIS — K52.9 PROCTOCOLITIS: Primary | ICD-10-CM

## 2023-09-28 PROBLEM — Z86.19 HISTORY OF HELICOBACTER PYLORI INFECTION: Status: RESOLVED | Noted: 2023-05-12 | Resolved: 2023-09-28

## 2023-09-28 PROBLEM — Z87.19 HISTORY OF GI BLEED: Status: RESOLVED | Noted: 2023-05-12 | Resolved: 2023-09-28

## 2023-09-28 PROBLEM — R10.84 GENERALIZED ABDOMINAL PAIN: Status: RESOLVED | Noted: 2023-07-25 | Resolved: 2023-09-28

## 2023-09-28 PROBLEM — K92.2 ACUTE GI BLEEDING: Status: RESOLVED | Noted: 2023-09-27 | Resolved: 2023-09-28

## 2023-09-28 PROBLEM — R10.11 RUQ PAIN: Status: RESOLVED | Noted: 2023-05-12 | Resolved: 2023-09-28

## 2023-09-28 PROBLEM — K92.2 GI BLEEDING: Status: RESOLVED | Noted: 2023-09-27 | Resolved: 2023-09-28

## 2023-09-28 PROBLEM — R10.13 EPIGASTRIC PAIN: Status: RESOLVED | Noted: 2023-05-12 | Resolved: 2023-09-28

## 2023-09-28 PROCEDURE — G0378 HOSPITAL OBSERVATION PER HR: HCPCS

## 2023-09-28 PROCEDURE — 99238 HOSP IP/OBS DSCHRG MGMT 30/<: CPT | Performed by: SURGERY

## 2023-09-28 RX ADMIN — SUCRALFATE 1 G: 1 TABLET ORAL at 08:42

## 2023-09-28 RX ADMIN — SUCRALFATE 1 G: 1 TABLET ORAL at 12:03

## 2023-09-28 NOTE — ANESTHESIA POSTPROCEDURE EVALUATION
Patient: Garfield Maguire    Procedure Summary       Date: 09/27/23 Room / Location: [facility] ENDOSCOPY 1 /  [facility]    Anesthesia Start: 1613 Anesthesia Stop: 1649    Procedures:       ESOPHAGOGASTRODUODENOSCOPY (Esophagus)      COLONOSCOPY WITH BIOPSY Diagnosis:       Acute GI bleeding      (Acute GI bleeding [K92.2])    Surgeons: Mayela Dillon DO Provider: Edenilson Pacheco CRNA    Anesthesia Type: MAC ASA Status: 1 - Emergent            Anesthesia Type: MAC    Vitals  Vitals Value Taken Time   /95 09/27/23 1731   Temp 97.7 °F (36.5 °C) 09/27/23 1700   Pulse 64 09/27/23 1733   Resp 16 09/27/23 1730   SpO2 94 % 09/27/23 1733   Vitals shown include unvalidated device data.        Post Anesthesia Care and Evaluation    Patient location during evaluation: PHASE II  Patient participation: complete - patient participated  Level of consciousness: awake and alert  Pain score: 0  Pain management: adequate    Airway patency: patent  Anesthetic complications: No anesthetic complications  PONV Status: none  Cardiovascular status: acceptable  Respiratory status: acceptable  Hydration status: acceptable

## 2023-09-28 NOTE — PLAN OF CARE
Goal Outcome Evaluation:  Plan of Care Reviewed With: patient        Progress: improving  Outcome Evaluation: Pt VSS, am labs pending. Pt s/p Esophagogastroduodenoscopy colonscopy with biopsy 9/27/23, pt reports bm overnight, passing gas. Pt on clear liquids, denies n/v overnight. Pt denies pain overnight. Pt up ad genesis to restroom. Pt reports desire to d/c home soon. Pt resting at this time.

## 2023-09-28 NOTE — DISCHARGE SUMMARY
Discharge Summary    Patient name: Garfield Maguire    Medical record number: [id number]    Admission date: 9/27/2023  Discharge date:  9/28/2023    Attending physician: Dr. CAROLYN Dillon    Primary care physician: Provider, No Known    Referring physician: Mayela [place]  [address] [place] [address]    Consulting physician(s):    Condition on discharge: stable    Primary Diagnoses: GI bleed    Secondary Diagnoses:     Operative Procedure: EGD and colonoscopy with biopsies    Hospital Course: The patient is a very pleasant 23 y.o. male that was admitted to the hospital with rectal bleeding.  He was admitted as observation and had labs drawn and was started on IV fluids.  He underwent an EGD and colonoscopy.  The EGD showed his duodenitis healing and no other abnormalities.  There was no source of bleeding on the EGD.  The colonoscopy revealed proctocolitis.  There was no active bleeding at the time but that was most likely the source of his bright red rectal bleeding.  Multiple biopsies were obtained and Ramos was admitted overnight.  By the day of discharge he was tolerating a regular diet.  His bleeding had essentially resolved.  He had no pain.  He was seen by the dietitian to discuss dietary changes.  I discussed with him referral to a GI physician.  He chose Dr. Trejo.  We will make that referral for him.  All of his questions were answered.    Discharge medications:      Discharge Medications        Continue These Medications        Instructions Start Date   sucralfate 1 g tablet  Commonly known as: Carafate   1 g, Oral, 4 Times Daily               Discharge instructions:    Low residue diet

## 2023-09-28 NOTE — DISCHARGE INSTR - APPOINTMENTS
Patient will get a list of local Primary Care Providers taking new patient with discharge paperwork.

## 2023-09-28 NOTE — CONSULTS
"Adult Nutrition  Assessment/PES    Patient Name:  Garfield Maguire  YOB: 2000  MRN: [id number]  Admit Date:  9/27/2023    Assessment Date:  9/28/2023    Comments:  Diet education for inflammatory bowel disease provided.     Reason for Assessment       Row Name 09/28/23 1307          Reason for Assessment    Reason For Assessment physician consult     Identified At Risk by Screening Criteria need for education                    Nutrition/Diet History       Row Name 09/28/23 1307          Nutrition/Diet History    Typical Intake (Food/Fluid/EN/PN) doesn't always eat at consistent times, especially difficult when working, has many food aversions/sometimes issues with textures     Food Intolerance(s) lactose     Factors Affecting Nutritional Intake altered gastrointestinal function;emotional/behavioral     Additional Information per pt started with abdominal cramping 2 days before admission but denies interfering with intake, pt does not report diarrhea                    Labs/Tests/Procedures/Meds       Row Name 09/28/23 1314          Labs/Procedures/Meds    Lab Results Reviewed reviewed        Medications    Pertinent Medications Reviewed reviewed                    Physical Findings       Row Name 09/28/23 1314          Physical Findings    Exam Agreement consented  pt did not meet criteria at this time                    Estimated/Assessed Needs - Anthropometrics       Row Name 09/28/23 1316          Anthropometrics    Age for Calculations 23     Height for Calculation 1.651 m (5' 5\")     Weight for Calculation 70.8 kg (156 lb 1.4 oz)        Estimated/Assessed Needs    Additional Documentation [place]-St. Jeor Equation (Group);Fluid Requirements (Group);Protein Requirements (Group)        Calumet-St. Jeor Equation    RMR (Calumet-St. Jeor Equation) 1619.875     Cal[place]et-St. Jeor Activity Factors 1.4 - 1.5     Activity Factors (Cal[place]et-St. Jeor) 3123.828 - 9197.9862        Protein Requirements    Weight " Used For Protein Calculations 70.8 kg (156 lb 1.4 oz)     Est Protein Requirement Amount (gms/kg) 1.2 gm protein     Estimated Protein Requirements (gms/day) 84.96        Fluid Requirements    Estimated Fluid Requirement Method RDA Method                    Nutrition Prescription Ordered       Row Name 09/28/23 1318          Nutrition Prescription PO    Current PO Diet Regular                    Evaluation of Received Nutrient/Fluid Intake       Row Name 09/28/23 1319          PO Evaluation    Number of Days PO Intake Evaluated Insufficient Data                       Problem/Interventions:   Problem 1       Row Name 09/28/23 1319          Nutrition Diagnoses Problem 1    Problem 1 Other (comment)  Altered Gastrointestional (GI) Function as related to diagnosis of GI Bleed as evidenced by EGD and colonoscopy with biopsies, previous NPO/Clear Liquid diet orders                          Intervention Goal       Row Name 09/28/23 1326          Intervention Goal    General Provide information regarding MNT for treatment/condition                        Education/Evaluation       Row Name 09/28/23 1327          Education    Education Provided education regarding     Provided education regarding Diet rationale;Other (comment)  limiting fiber while experiencing symptoms, smaller/more frequent meals, reading food label for fiber/low fiber/high fiber foods        Monitor/Evaluation    Monitor Other (comment)  pt very receptive to education with many questions.  Pt demonstrated understanding via teachback (3 examples of low fiber foods, keeping fiber to no more than 8 grams per day while experiencing symptoms)     Education Follow-up --  RD contact along with handouts provided:  Inflammatory Bowel Disease:  Chron's Disease and Ulcerative Colitis, Low Fiber Nutrition Therapy, High Fiber Nutrition Therapy                     Electronically signed by:  Kia Banuelos RD  09/28/23 13:36 EDT

## 2023-09-28 NOTE — CASE MANAGEMENT/SOCIAL WORK
Case Management Discharge Note      Final Note: Discharged home.    Provided Post Acute Provider List?: Refused  Refused Provider List Comment: pt declined    Selected Continued Care - Discharged on 9/28/2023 Admission date: 9/27/2023 - Discharge disposition: Home or Self Care      Destination    No services have been selected for the patient.                Durable Medical Equipment    No services have been selected for the patient.                Dialysis/Infusion    No services have been selected for the patient.                Home Medical Care    No services have been selected for the patient.                Therapy    No services have been selected for the patient.                Community Resources    No services have been selected for the patient.                Community & DME    No services have been selected for the patient.                    Selected Continued Care - Episodes Includes continued care and service providers with selected services from the active episodes listed below      Rising Risk Care Management Episode start date: 9/28/2023   There are no active outsourced providers for this episode.                      Final Discharge Disposition Code: 01 - home or self-care

## 2023-09-29 ENCOUNTER — READMISSION MANAGEMENT (OUTPATIENT)
Dept: CALL CENTER | Facility: HOSPITAL | Age: 23
End: 2023-09-29
Payer: COMMERCIAL

## 2023-09-29 LAB
LAB AP CASE REPORT: NORMAL
PATH REPORT.FINAL DX SPEC: NORMAL
PATH REPORT.GROSS SPEC: NORMAL

## 2023-09-29 NOTE — OUTREACH NOTE
Prep Survey      Flowsheet Row Responses   [facility] facility patient discharged from? [facility]   Is LACE score < 7 ? No   Eligibility Readm Mgmt   Discharge diagnosis Acute GI bleeding   Does the patient have one of the following disease processes/diagnoses(primary or secondary)? Other   Does the patient have Home health ordered? No   Is there a DME ordered? No   Prep survey completed? Yes            Aleshia KEANE - Registered Nurse

## 2023-10-02 ENCOUNTER — READMISSION MANAGEMENT (OUTPATIENT)
Dept: CALL CENTER | Facility: HOSPITAL | Age: 23
End: 2023-10-02
Payer: COMMERCIAL

## 2023-10-02 NOTE — OUTREACH NOTE
Medical Week 1 Survey      Flowsheet Row Responses   [facility] facility patient discharged from? [place]   Does the patient have one of the following disease processes/diagnoses(primary or secondary)? Other   Week 1 attempt successful? No   Unsuccessful attempts Attempt 1            Elizabeth Melgar Registered Nurse

## 2023-10-05 ENCOUNTER — OFFICE VISIT (OUTPATIENT)
Dept: GASTROENTEROLOGY | Facility: CLINIC | Age: 23
End: 2023-10-05
Payer: COMMERCIAL

## 2023-10-05 ENCOUNTER — READMISSION MANAGEMENT (OUTPATIENT)
Dept: CALL CENTER | Facility: HOSPITAL | Age: 23
End: 2023-10-05
Payer: COMMERCIAL

## 2023-10-05 ENCOUNTER — PATIENT OUTREACH (OUTPATIENT)
Dept: CASE MANAGEMENT | Facility: OTHER | Age: 23
End: 2023-10-05
Payer: COMMERCIAL

## 2023-10-05 VITALS
TEMPERATURE: 97.1 F | SYSTOLIC BLOOD PRESSURE: 118 MMHG | DIASTOLIC BLOOD PRESSURE: 70 MMHG | OXYGEN SATURATION: 99 % | BODY MASS INDEX: 25.86 KG/M2 | HEART RATE: 62 BPM | WEIGHT: 155.2 LBS | HEIGHT: 65 IN

## 2023-10-05 DIAGNOSIS — R19.7 DIARRHEA, UNSPECIFIED TYPE: ICD-10-CM

## 2023-10-05 DIAGNOSIS — Z86.010 HISTORY OF COLON POLYPS: ICD-10-CM

## 2023-10-05 DIAGNOSIS — A04.8 H. PYLORI INFECTION: ICD-10-CM

## 2023-10-05 DIAGNOSIS — K52.9 PROCTOCOLITIS: Primary | ICD-10-CM

## 2023-10-05 PROCEDURE — 99204 OFFICE O/P NEW MOD 45 MIN: CPT | Performed by: NURSE PRACTITIONER

## 2023-10-05 NOTE — OUTREACH NOTE
AMBULATORY CASE MANAGEMENT NOTE    Name and Relationship of Patient/Support Person: Garfield Maguire - Self    Patient Outreach    Call to patient for f/u after admission for abd pain/GI bleeding. Has had f/u with GI and he states he feels about 80% better.  No c/o pain or fever or evident blood. He has made changes to diet. States he is avoiding fast food and has increased vegetables and fruit intake significantly. He has pending testing to see if previous H Pylori is cleared and other stool tests for current infection. No SDOH deficits noted at this time. Will monitor for any additional concerns    LENCHO ALEXANDER  Ambulatory Case Management    10/5/2023, 14:01 EDT

## 2023-10-05 NOTE — PATIENT INSTRUCTIONS
Obtain urea breath test to confirm eradication of H. pylori.     Obtain stool studies to assess for infection.     Check fecal calprotectin level.    Further recommendations to be made pending results of the above work-up and clinical course.

## 2023-10-05 NOTE — PROGRESS NOTES
Chief Complaint   Patient presents with    GI Bleeding         History of Present Illness  Patient is a 23-year-old male who presents today for Evaluation.  He was referred for proctocolitis.  He was recently admitted to the hospital with rectal bleeding.  He had an EGD and colonoscopy performed to evaluate this.  EGD with mild duodenitis.  Colonoscopy with evidence of colitis in the rectum and sigmoid colon.  Biopsies were nonspecific, showed an acute colitis.    Patient was initially evaluated for epigastric and right upper quadrant pain.  He underwent gallbladder ultrasound and HIDA scan which were unrevealing.  He had an EGD and colonoscopy August 30, 2023 that showed esophagitis, gastric ulcer, duodenitis and a sigmoid polyp.  There was no colitis at that time.  He was found to have H. pylori infection for which he was treated.  He reports he took 2 rounds of treatment, initially treated with amoxicillin and clarythromycin and then treated with metronidazole and tetracycline.  He has not had follow-up testing to confirm eradication.    Three days prior to hospital admission he developed rectal bleeding.  Blood was described as being bright red and mixed in with the stool and there was mucus in his stool.  He was also experiencing increased frequency with loose stools.  Reports fatigue.    He denies any abdominal pain, fever, nausea or vomiting.  Denies any sick contacts.    Reports he has continued to have occasional bleeding but the amount and frequency has decreased.  His stool remains loose and unformed.  He is not having any abdominal pain.    He denies use of NSAIDs.    He does not know his family history.     Result Review :       NM HIDA SCAN WITH PHARMACOLOGICAL INTERVENTION (08/14/2023 09:22)    US Gallbladder (07/07/2023 16:11)    CT Abdomen Pelvis With Contrast (07/20/2023 16:57)    Comprehensive Metabolic Panel (09/27/2023 07:50)    CBC & Differential (09/27/2023 07:50)    Tissue Pathology Exam  "(09/27/2023 16:43)    Referral to Gastroenterology for Proctocolitis (09/28/2023)    Vital Signs:   /70   Pulse 62   Temp 97.1 °F (36.2 °C)   Ht 165.1 cm (65\")   Wt 70.4 kg (155 lb 3.2 oz)   SpO2 99%   BMI 25.83 kg/m²     Body mass index is 25.83 kg/m².     Physical Exam  Vitals reviewed.   Constitutional:       General: He is not in acute distress.     Appearance: He is well-developed.   HENT:      Head: Normocephalic and atraumatic.   Pulmonary:      Effort: Pulmonary effort is normal. No respiratory distress.   Abdominal:      General: Abdomen is flat. Bowel sounds are normal. There is no distension.      Palpations: Abdomen is soft.      Tenderness: There is no abdominal tenderness.   Skin:     General: Skin is dry.      Coloration: Skin is not pale.   Neurological:      Mental Status: He is alert and oriented to person, place, and time.   Psychiatric:         Thought Content: Thought content normal.         Assessment and Plan    Diagnoses and all orders for this visit:    1. Proctocolitis (Primary)  -     Gastrointestinal Panel, PCR - Stool, Per Rectum  -     Stool Culture (Reference Lab) - Stool, Per Rectum  -     Calprotectin, Fecal - Stool, Per Rectum    2. H. pylori infection  -     H. Pylori Breath Test - Breath, Lung    3. Diarrhea, unspecified type  -     Gastrointestinal Panel, PCR - Stool, Per Rectum  -     Stool Culture (Reference Lab) - Stool, Per Rectum  -     Calprotectin, Fecal - Stool, Per Rectum    4. History of colon polyps         Discussion  Patient presents today for follow-up following hospitalization for rectal bleeding with colonoscopy showing proctocolitis.  Biopsies favoring acute colitis though changes were nonspecific.  Symptoms are improving at this time.  Discussed with patient today as colonoscopy with no evidence of colitis around 1 month prior, suspect this is an acute infectious colitis.  We will have him submit stool studies to assess for infection.  Could " potentially represent C. difficile infection due to recent antibiotic use for H. pylori.  We will also check a fecal calprotectin to establish a baseline.  If stool studies are negative and fecal calprotectin elevated, may consider treatment for colitis.  We will also obtain an H. pylori breath test to confirm eradication of infection after treatment.          Follow Up   Return for Follow up to review results after testing complete.    Patient Instructions   Obtain urea breath test to confirm eradication of H. pylori.     Obtain stool studies to assess for infection.     Check fecal calprotectin level.    Further recommendations to be made pending results of the above work-up and clinical course.

## 2023-10-05 NOTE — OUTREACH NOTE
Medical Week 1 Survey      Flowsheet Row Responses   [facility] facility patient discharged from? [place]   Does the patient have one of the following disease processes/diagnoses(primary or secondary)? Other   Week 1 attempt successful? No   Unsuccessful attempts Attempt 2   Revoke Other transitional program  [followed by [facility]]            Bryanna CAMARGO - Registered Nurse

## 2023-10-06 ENCOUNTER — LAB (OUTPATIENT)
Dept: LAB | Facility: HOSPITAL | Age: 23
End: 2023-10-06
Payer: COMMERCIAL

## 2023-10-06 LAB — UREA BREATH TEST QL: NEGATIVE

## 2023-10-06 PROCEDURE — 87045 FECES CULTURE AEROBIC BACT: CPT | Performed by: NURSE PRACTITIONER

## 2023-10-06 PROCEDURE — 87046 STOOL CULTR AEROBIC BACT EA: CPT | Performed by: NURSE PRACTITIONER

## 2023-10-06 PROCEDURE — 87507 IADNA-DNA/RNA PROBE TQ 12-25: CPT | Performed by: NURSE PRACTITIONER

## 2023-10-06 PROCEDURE — 87427 SHIGA-LIKE TOXIN AG IA: CPT | Performed by: NURSE PRACTITIONER

## 2023-10-06 PROCEDURE — 83993 ASSAY FOR CALPROTECTIN FECAL: CPT | Performed by: NURSE PRACTITIONER

## 2023-10-08 LAB
BACTERIA SPEC CULT: NORMAL
E COLI SXT STL QL IA: NEGATIVE
SALM + SHIG STL CULT: NORMAL

## 2023-10-09 LAB
ADV 40+41 DNA STL QL NAA+NON-PROBE: NOT DETECTED
ASTRO TYP 1-8 RNA STL QL NAA+NON-PROBE: NOT DETECTED
C CAYETANENSIS DNA STL QL NAA+NON-PROBE: NOT DETECTED
C COLI+JEJ+UPSA DNA STL QL NAA+NON-PROBE: NOT DETECTED
C DIF TOX TCDA+TCDB STL QL NAA+NON-PROBE: DETECTED
CALPROTECTIN STL-MCNT: 391 UG/G (ref 0–120)
CRYPTOSP DNA STL QL NAA+NON-PROBE: NOT DETECTED
E COLI O157 DNA STL QL NAA+NON-PROBE: ABNORMAL
E HISTOLYT DNA STL QL NAA+NON-PROBE: NOT DETECTED
EAEC PAA PLAS AGGR+AATA ST NAA+NON-PRB: NOT DETECTED
EC STX1+STX2 GENES STL QL NAA+NON-PROBE: NOT DETECTED
EPEC EAE GENE STL QL NAA+NON-PROBE: NOT DETECTED
ETEC LTA+ST1A+ST1B TOX ST NAA+NON-PROBE: NOT DETECTED
G LAMBLIA DNA STL QL NAA+NON-PROBE: NOT DETECTED
NOROVIRUS GI+II RNA STL QL NAA+NON-PROBE: NOT DETECTED
P SHIGELLOIDES DNA STL QL NAA+NON-PROBE: NOT DETECTED
RVA RNA STL QL NAA+NON-PROBE: NOT DETECTED
S ENT+BONG DNA STL QL NAA+NON-PROBE: NOT DETECTED
SAPO I+II+IV+V RNA STL QL NAA+NON-PROBE: NOT DETECTED
SHIGELLA SP+EIEC IPAH ST NAA+NON-PROBE: NOT DETECTED
V CHOL+PARA+VUL DNA STL QL NAA+NON-PROBE: NOT DETECTED
V CHOLERAE DNA STL QL NAA+NON-PROBE: NOT DETECTED
Y ENTEROCOL DNA STL QL NAA+NON-PROBE: NOT DETECTED

## 2023-10-09 RX ORDER — VANCOMYCIN HYDROCHLORIDE 125 MG/1
125 CAPSULE ORAL 4 TIMES DAILY
Qty: 40 CAPSULE | Refills: 0 | Status: SHIPPED | OUTPATIENT
Start: 2023-10-09 | End: 2023-10-19

## 2023-10-10 LAB
BACTERIA SPEC CULT: NORMAL
BACTERIA SPEC CULT: NORMAL
CAMPYLOBACTER STL CULT: NORMAL
E COLI SXT STL QL IA: NEGATIVE
SALM + SHIG STL CULT: NORMAL

## 2024-01-12 ENCOUNTER — PATIENT OUTREACH (OUTPATIENT)
Dept: CASE MANAGEMENT | Facility: OTHER | Age: 24
End: 2024-01-12
Payer: COMMERCIAL

## 2024-01-12 NOTE — OUTREACH NOTE
AMBULATORY CASE MANAGEMENT NOTE    Name and Relationship of Patient/Support Person: Garfield Maguire - Self  Self    Attempted outreach with no answer. Patient has had no addition complications or f/u since ED visit. NO additional f/u needed at this time. Will close    LENCHO ALEXANDER  Ambulatory Case Management    1/12/2024, 15:13 EST

## 2024-01-30 RX ORDER — LEVOFLOXACIN 500 MG/1
500 TABLET, FILM COATED ORAL DAILY
Qty: 10 TABLET | Refills: 0 | Status: SHIPPED | OUTPATIENT
Start: 2024-01-30 | End: 2024-02-09

## 2024-08-22 DIAGNOSIS — R10.13 EPIGASTRIC PAIN: Primary | ICD-10-CM

## 2024-11-26 PROBLEM — R10.12 LEFT UPPER QUADRANT ABDOMINAL PAIN: Status: ACTIVE | Noted: 2024-11-26

## 2024-11-26 PROBLEM — K52.9: Status: ACTIVE | Noted: 2024-11-26

## 2024-11-27 ENCOUNTER — OFFICE VISIT (OUTPATIENT)
Dept: GASTROENTEROLOGY | Facility: CLINIC | Age: 24
End: 2024-11-27
Payer: COMMERCIAL

## 2024-11-27 ENCOUNTER — LAB (OUTPATIENT)
Dept: LAB | Facility: HOSPITAL | Age: 24
End: 2024-11-27
Payer: COMMERCIAL

## 2024-11-27 ENCOUNTER — HOSPITAL ENCOUNTER (OUTPATIENT)
Dept: GENERAL RADIOLOGY | Facility: HOSPITAL | Age: 24
Discharge: HOME | End: 2024-11-27
Payer: COMMERCIAL

## 2024-11-27 VITALS
HEIGHT: 65 IN | SYSTOLIC BLOOD PRESSURE: 122 MMHG | DIASTOLIC BLOOD PRESSURE: 86 MMHG | WEIGHT: 168 LBS | BODY MASS INDEX: 27.99 KG/M2

## 2024-11-27 DIAGNOSIS — R10.12 LEFT UPPER QUADRANT ABDOMINAL PAIN: ICD-10-CM

## 2024-11-27 DIAGNOSIS — R10.12 LEFT UPPER QUADRANT ABDOMINAL PAIN: Primary | ICD-10-CM

## 2024-11-27 DIAGNOSIS — K52.9 CRYPTITIS OF COLON: ICD-10-CM

## 2024-11-27 DIAGNOSIS — K58.1 IRRITABLE BOWEL SYNDROME WITH CONSTIPATION: Primary | ICD-10-CM

## 2024-11-27 LAB
DEPRECATED RDW RBC AUTO: 40.3 FL (ref 37–54)
ERYTHROCYTE [DISTWIDTH] IN BLOOD BY AUTOMATED COUNT: 12.9 % (ref 12.3–15.4)
HCT VFR BLD AUTO: 45.4 % (ref 37.5–51)
HGB BLD-MCNC: 14.9 G/DL (ref 13–17.7)
MCH RBC QN AUTO: 28.1 PG (ref 26.6–33)
MCHC RBC AUTO-ENTMCNC: 32.8 G/DL (ref 31.5–35.7)
MCV RBC AUTO: 85.7 FL (ref 79–97)
PLATELET # BLD AUTO: 261 10*3/MM3 (ref 140–450)
PMV BLD AUTO: 11.1 FL (ref 6–12)
RBC # BLD AUTO: 5.3 10*6/MM3 (ref 4.14–5.8)
WBC NRBC COR # BLD AUTO: 8.55 10*3/MM3 (ref 3.4–10.8)

## 2024-11-27 PROCEDURE — 81405 MOPATH PROCEDURE LEVEL 6: CPT

## 2024-11-27 PROCEDURE — 88346 IMFLUOR 1ST 1ANTB STAIN PX: CPT

## 2024-11-27 PROCEDURE — 82397 CHEMILUMINESCENT ASSAY: CPT

## 2024-11-27 PROCEDURE — 83520 IMMUNOASSAY QUANT NOS NONAB: CPT

## 2024-11-27 PROCEDURE — 81479 UNLISTED MOLECULAR PATHOLOGY: CPT

## 2024-11-27 PROCEDURE — 85027 COMPLETE CBC AUTOMATED: CPT | Performed by: STUDENT IN AN ORGANIZED HEALTH CARE EDUCATION/TRAINING PROGRAM

## 2024-11-27 PROCEDURE — 36415 COLL VENOUS BLD VENIPUNCTURE: CPT | Performed by: STUDENT IN AN ORGANIZED HEALTH CARE EDUCATION/TRAINING PROGRAM

## 2024-11-27 PROCEDURE — 74018 RADEX ABDOMEN 1 VIEW: CPT

## 2024-11-27 PROCEDURE — 86140 C-REACTIVE PROTEIN: CPT

## 2024-11-27 PROCEDURE — 88350 IMFLUOR EA ADDL 1ANTB STN PX: CPT

## 2024-11-27 RX ORDER — PLECANATIDE 3 MG/1
3 TABLET ORAL DAILY
Qty: 30 TABLET | Refills: 11 | Status: SHIPPED | OUTPATIENT
Start: 2024-11-27

## 2024-11-27 NOTE — PROGRESS NOTES
- Notified patient of the results showing large colonic stool burden which is likely the cause of his abdominal pain. All questions answered.   - POC: starting Trulance for IBS-C

## 2024-11-27 NOTE — ASSESSMENT & PLAN NOTE
- Obtain [facility] panel to evaluate for underlying IBD  - Obtain CBC to evaluate given intermittent hematochezia

## 2024-11-27 NOTE — PROGRESS NOTES
Garfield Maguire is a 24 y.o. male with PMH of H Pylori infection, CDI + noted below who presents with   Chief Complaint   Patient presents with    Abdominal Pain       Subjective     # LUQ Abdominal Pain, improving    - Reports LUQ has improved within the last week. Has some associated indigestion.   - Reports having a BM every 2 to 3 days. Denies straining with his bowel movements. Reports previously being unable to tolerate Linzess.   - EGD in 8/2023 had a gastric ulcer (biopsied -- positive for H Pylori), esophagitis, duodenitis.   - EGD in 9/2023 had mild duodenitis (improved from 1 month prior).   - H Pylori breath test negative on 10/5 confirming eradication.   - CT A/P in 7/2023 was normal.     # Cryptitis   - Reports scant bright red blood with his bowel movements intermittently.   - C/s in 9/2023 had proctosigmoiditis (biopsied c/s focal cryptitis).   - Diagnosed with CDI in 10/2023.           Past Medical History:   Diagnosis Date    Colon polyp 9/27/2023 September 2023 look at report from Dr Santos    GERD (gastroesophageal reflux disease) 9/27/23 September 2023 and +3 years    GI (gastrointestinal bleed) 12/25/22    Followed up in September 2023    Irritable bowel syndrome 9/27/23 September 2023 and +3 years    Multiple gastric ulcers        Social History     Socioeconomic History    Marital status: Single   Tobacco Use    Smoking status: Never    Smokeless tobacco: Never    Tobacco comments:     Not used   Vaping Use    Vaping status: Never Used   Substance and Sexual Activity    Alcohol use: Never    Drug use: Never    Sexual activity: Not Currently     Partners: Female     Birth control/protection: None       No current outpatient medications on file.    Objective   Vitals:    11/27/24 0858   BP: 122/86         11/27/24 0858   Weight: 76.2 kg (168 lb)     Body mass index is 27.96 kg/m².      Physical Exam  Vitals reviewed.   Constitutional:       Appearance: Normal appearance.   HENT:       Head: Normocephalic and atraumatic.   Eyes:      Extraocular Movements: Extraocular movements intact.      Conjunctiva/sclera: Conjunctivae normal.   Cardiovascular:      Rate and Rhythm: Normal rate and regular rhythm.      Heart sounds: Normal heart sounds.   Pulmonary:      Effort: Pulmonary effort is normal.      Breath sounds: Normal breath sounds.   Abdominal:      General: Abdomen is flat. Bowel sounds are normal. There is no distension.      Palpations: Abdomen is soft.      Tenderness: There is no abdominal tenderness.   Neurological:      Mental Status: He is alert.   Psychiatric:         Mood and Affect: Mood normal.         Behavior: Behavior normal.         WBC   Date Value Ref Range Status   09/27/2023 4.63 3.40 - 10.80 10*3/mm3 Final     RBC   Date Value Ref Range Status   09/27/2023 4.95 4.14 - 5.80 10*6/mm3 Final     Hemoglobin   Date Value Ref Range Status   09/27/2023 12.4 (L) 13.0 - 17.7 g/dL Final   01/27/2023 12.3 (L) 13.2 - 17.1 g/dL Final     Hematocrit   Date Value Ref Range Status   09/27/2023 39.0 37.5 - 51.0 % Final   01/27/2023 36.3 (L) 38.5 - 50.0 % Final     MCV   Date Value Ref Range Status   09/27/2023 78.8 (L) 79.0 - 97.0 fL Final     MCH   Date Value Ref Range Status   09/27/2023 25.1 (L) 26.6 - 33.0 pg Final     MCHC   Date Value Ref Range Status   09/27/2023 31.8 31.5 - 35.7 g/dL Final     RDW   Date Value Ref Range Status   09/27/2023 14.6 12.3 - 15.4 % Final     RDW-SD   Date Value Ref Range Status   09/27/2023 41.3 37.0 - 54.0 fl Final     MPV   Date Value Ref Range Status   09/27/2023 10.3 6.0 - 12.0 fL Final     Platelets   Date Value Ref Range Status   09/27/2023 268 140 - 450 10*3/mm3 Final     Neutrophil %   Date Value Ref Range Status   09/27/2023 54.0 42.7 - 76.0 % Final     Lymphocyte %   Date Value Ref Range Status   09/27/2023 27.2 19.6 - 45.3 % Final     Monocyte %   Date Value Ref Range Status   09/27/2023 14.9 (H) 5.0 - 12.0 % Final     Eosinophil %   Date Value  Ref Range Status   09/27/2023 2.8 0.3 - 6.2 % Final     Basophil %   Date Value Ref Range Status   09/27/2023 0.9 0.0 - 1.5 % Final     Immature Grans %   Date Value Ref Range Status   09/27/2023 0.2 0.0 - 0.5 % Final     Neutrophils Absolute   Date Value Ref Range Status   12/26/2022 4 1.5 - 7.1 x10(3)/ul Final     Neutrophils, Absolute   Date Value Ref Range Status   09/27/2023 2.50 1.70 - 7.00 10*3/mm3 Final     Lymphocytes, Absolute   Date Value Ref Range Status   09/27/2023 1.26 0.70 - 3.10 10*3/mm3 Final     Monocytes, Absolute   Date Value Ref Range Status   09/27/2023 0.69 0.10 - 0.90 10*3/mm3 Final     Eosinophils Absolute   Date Value Ref Range Status   12/26/2022 0 0.0 - 0.7 x10(3)/ul Final     Eosinophils, Absolute   Date Value Ref Range Status   09/27/2023 0.13 0.00 - 0.40 10*3/mm3 Final     Basophils Absolute   Date Value Ref Range Status   12/26/2022 0 0.0 - 0.3 x10(3)/ul Final     Basophils, Absolute   Date Value Ref Range Status   09/27/2023 0.04 0.00 - 0.20 10*3/mm3 Final     Immature Grans, Absolute   Date Value Ref Range Status   09/27/2023 0.01 0.00 - 0.05 10*3/mm3 Final     nRBC   Date Value Ref Range Status   09/27/2023 0.0 0.0 - 0.2 /100 WBC Final       Lab Results   Component Value Date    GLUCOSE 108 (H) 09/27/2023    BUN 11 09/27/2023    CREATININE 0.75 (L) 09/27/2023    EGFRIFNONA 123 09/16/2021    EGFRIFAFRI 143 09/16/2021    BCR 14.7 09/27/2023    CO2 23.3 09/27/2023    CALCIUM 8.8 09/27/2023    ALBUMIN 4.3 09/27/2023    LABIL2 1.7 09/16/2021    AST 24 09/27/2023    ALT 37 09/27/2023         Imaging Results (Last 7 Days)       ** No results found for the last 168 hours. **              Assessment & Plan   Diagnoses and all orders for this visit:    1. Left upper quadrant abdominal pain (Primary)  Assessment & Plan:  - Improving   - Obtain KUB to evaluate for colonic stool burden. If has colonic stool burden, will start Trulance    - Instructed to take OTC Miralax for mild constipation      Orders:  -     XR Abdomen KUB; Future    2. Cryptitis of colon  Assessment & Plan:  - Obtain [facility] panel to evaluate for underlying IBD  - Obtain CBC to evaluate given intermittent hematochezia     Orders:  -     IBD Sgi Diagnostic ([facility]); Future  -     Cancel: Calprotectin, Fecal - Stool, Per Rectum  -     CBC (No Diff)      RTC in 3 months     I have discussed the above plan with the patient.  They verbalize understanding and are in agreement with the plan.  They have been advised to contact the office for any questions, concerns, or changes related to their health.

## 2024-11-27 NOTE — ASSESSMENT & PLAN NOTE
- Improving   - Obtain KUB to evaluate for colonic stool burden. If has colonic stool burden, will start Trulance    - Instructed to take OTC Miralax for mild constipation

## 2024-11-30 NOTE — PROGRESS NOTES
- CBC obtained for cryptitis which was normal   - POC: [facility] panel still pending to further evaluate cryptitis seen previously on pathology results.

## 2024-12-10 LAB — REF LAB TEST METHOD: NORMAL

## 2025-02-16 PROBLEM — K58.1 IRRITABLE BOWEL SYNDROME WITH CONSTIPATION: Status: ACTIVE | Noted: 2025-02-16

## (undated) DEVICE — SOL IRR H2O BTL 1000ML STRL

## (undated) DEVICE — FLEX ADVANTAGE 1500CC: Brand: FLEX ADVANTAGE

## (undated) DEVICE — SYRINGE, LUER SLIP, STERILE, 60ML: Brand: MEDLINE

## (undated) DEVICE — LASSO POLYPECTOMY SNARE: Brand: LASSO

## (undated) DEVICE — THE SINGLE USE ETRAP – POLYP TRAP IS USED FOR SUCTION RETRIEVAL OF ENDOSCOPICALLY REMOVED POLYPS.: Brand: ETRAP

## (undated) DEVICE — KT ORCA ORCAPOD DISP STRL

## (undated) DEVICE — FRCP BX RADJAW4 NDL 2.8 240CM LG OG BX40

## (undated) DEVICE — CANN O2 ETCO2 FITS ALL CONN CO2 SMPL A/ 7IN DISP LF

## (undated) DEVICE — BW-412T DISP COMBO CLEANING BRUSH: Brand: SINGLE USE COMBINATION CLEANING BRUSH

## (undated) DEVICE — SYR LL 3CC

## (undated) DEVICE — Device

## (undated) DEVICE — GOWN PROC ENDOARMOR GI LVL3 HY/SHLD UNIV

## (undated) DEVICE — LINER SURG CANSTR SXN S/RIGD 1500CC

## (undated) DEVICE — LAB CORP CLOTEST UREASE

## (undated) DEVICE — BITEBLOCK OMNI BLOC

## (undated) DEVICE — LAB CORP AGAR SLANT UREA PK/10

## (undated) DEVICE — VIAL FORMALIN CAP 10P 40ML

## (undated) DEVICE — ADAPT CLN SCPE ENDO PORPOISE BX/50 DISP

## (undated) DEVICE — GOWN ISOL W/THUMB UNIV BLU BX/15

## (undated) DEVICE — ADAPT CLN BIOGUARD AIR/H2O DISP

## (undated) DEVICE — THE BITE BLOCK MAXI, LATEX FREE STRAP IS USED TO PROTECT THE ENDOSCOPE INSERTION TUBE FROM BEING BITTEN BY THE PATIENT.

## (undated) DEVICE — VIAL FORMLN CAP 10PCT 20ML

## (undated) DEVICE — SINGLE-USE BIOPSY FORCEPS: Brand: RADIAL JAW 4

## (undated) DEVICE — 9165 UNIVERSAL PATIENT PLATE: Brand: 3M™